# Patient Record
Sex: FEMALE | Race: WHITE | NOT HISPANIC OR LATINO | Employment: PART TIME | URBAN - METROPOLITAN AREA
[De-identification: names, ages, dates, MRNs, and addresses within clinical notes are randomized per-mention and may not be internally consistent; named-entity substitution may affect disease eponyms.]

---

## 2024-04-07 ENCOUNTER — OFFICE VISIT (OUTPATIENT)
Dept: URGENT CARE | Facility: URGENT CARE | Age: 24
End: 2024-04-07
Payer: COMMERCIAL

## 2024-04-07 VITALS
DIASTOLIC BLOOD PRESSURE: 86 MMHG | OXYGEN SATURATION: 99 % | HEART RATE: 98 BPM | SYSTOLIC BLOOD PRESSURE: 137 MMHG | RESPIRATION RATE: 20 BRPM | TEMPERATURE: 97.9 F | WEIGHT: 156.8 LBS

## 2024-04-07 DIAGNOSIS — H92.01 RIGHT EAR PAIN: Primary | ICD-10-CM

## 2024-04-07 DIAGNOSIS — J06.9 VIRAL URI WITH COUGH: ICD-10-CM

## 2024-04-07 DIAGNOSIS — H61.23 EXCESSIVE EAR WAX, BILATERAL: ICD-10-CM

## 2024-04-07 PROCEDURE — 99203 OFFICE O/P NEW LOW 30 MIN: CPT | Mod: 25 | Performed by: INTERNAL MEDICINE

## 2024-04-07 PROCEDURE — 69209 REMOVE IMPACTED EAR WAX UNI: CPT | Mod: RT | Performed by: INTERNAL MEDICINE

## 2024-04-07 RX ORDER — AMOXICILLIN 500 MG/1
500 CAPSULE ORAL 3 TIMES DAILY
Qty: 30 CAPSULE | Refills: 0 | Status: SHIPPED | OUTPATIENT
Start: 2024-04-07 | End: 2024-04-17

## 2024-04-07 NOTE — PATIENT INSTRUCTIONS
Ear wax removed  TM looks decent but with recent sinus cold you are set up for ear infection  Can observe, but if pain not better, then start amoxicillin

## 2024-04-07 NOTE — PROGRESS NOTES
ASSESSMENT AND PLAN:      ICD-10-CM    1. Right ear pain  H92.01 amoxicillin (AMOXIL) 500 MG capsule      2. Excessive ear wax, bilateral  H61.23 IA REMOVAL IMPACTED CERUMEN IRRIGATION/LVG UNILAT     CANCELED: IA REMOVAL IMPACTED CERUMEN IRRIGATION/LVG UNILAT      3. Viral URI with cough  J06.9         Patient Instructions     Ear wax removed  TM looks decent but with recent sinus cold you are set up for ear infection  Can observe, but if pain not better, then start amoxicillin      Return if symptoms worsen or fail to improve, for ear check.        Diana Sarkar MD  [facility] URGENT [facility]ARE    Subjective     Ruby Negrete is a 23 year old who presents for Patient presents with:  Otalgia: right    a new patient of [facility].      Onset of sinus cold symptoms was 1 week(s) ago.  Course of illness is improved but now ear pain.      Current and Associated symptoms: cough - productive and ear pain right  Denies fever, facial pain/pressure, and fatigue  Treatment measures tried include ear drops  Predisposing factors include ill contact: Work      Review of Systems        Objective    /86   Pulse 98   Temp 97.9  F (36.6  C) (Tympanic)   Resp 20   Wt 71.1 kg (156 lb 12.8 oz)   SpO2 99%   Physical Exam  Vitals reviewed.   Constitutional:       Appearance: Normal appearance.   HENT:      Right Ear: There is impacted cerumen.      Left Ear: There is impacted cerumen.      Nose: Congestion present.      Mouth/Throat:      Mouth: Mucous membranes are moist.      Pharynx: Oropharynx is clear.   Cardiovascular:      Rate and Rhythm: Normal rate and regular rhythm.      Pulses: Normal pulses.      Heart sounds: Normal heart sounds.   Pulmonary:      Effort: Pulmonary effort is normal.      Breath sounds: Normal breath sounds.   Neurological:      Mental Status: She is alert.        M.A. irrigated right ear -   Wax removed  TM boggy.

## 2024-08-08 NOTE — PATIENT INSTRUCTIONS
If you have labs or imaging done, the results will automatically release in [email or web address] without an interpretation.  Your health care professional will review those results and send an interpretation with recommendations as soon as possible, but this may be 1-3 business days.    If you have any questions regarding your visit, please contact your care team.     [facility]: [phone]  [facility] CLINIC HOURS TELEPHONE NUMBER   Yogesh PRABHAKAR Andreas .      Violeta-KALEB Lopez-KALEB Segura-Surgery Scheduler  Matilda-         Monday-[place]  8:00 am-4:00 pm  [facility]4:00 [facility]  Wednesday-[place] 8:00 am-4:00 pm  Thursday-[place] 8:00 am-4:00 pm    Typical Surgery Day Friday [facility]  [address] [place] [address]  PH: [phone] Fax    Imaging Scheduling all locations  PH: [phone]     [facility]  [address]  [place], [place] [address] [phone]    [facility]  [address] [place] [address]  PH: [phone]     **Surgeries** Our Surgery Schedulers will contact you to schedule. If you do not receive a call within 3 business days, please call [phone].  Urgent Care locations:  [facility]       Monday-Friday   10 am - 8 pm  Saturday and Sunday   9 am - 5 pm   [phone]   If you need a medication refill, please contact your pharmacy. Please allow 3 business days for your refill to be completed.  As always, Thank you for trusting us with your healthcare needs!

## 2024-08-12 ENCOUNTER — OFFICE VISIT (OUTPATIENT)
Dept: OBGYN | Facility: CLINIC | Age: 24
End: 2024-08-12
Payer: COMMERCIAL

## 2024-08-12 VITALS
WEIGHT: 154.6 LBS | SYSTOLIC BLOOD PRESSURE: 125 MMHG | DIASTOLIC BLOOD PRESSURE: 81 MMHG | HEIGHT: 66 IN | HEART RATE: 64 BPM | BODY MASS INDEX: 24.85 KG/M2

## 2024-08-12 DIAGNOSIS — Z01.419 ENCOUNTER FOR ANNUAL ROUTINE GYNECOLOGICAL EXAMINATION: Primary | ICD-10-CM

## 2024-08-12 DIAGNOSIS — Z12.4 CERVICAL CANCER SCREENING: ICD-10-CM

## 2024-08-12 DIAGNOSIS — Z11.3 SCREENING FOR STDS (SEXUALLY TRANSMITTED DISEASES): ICD-10-CM

## 2024-08-12 LAB
C TRACH DNA SPEC QL PROBE+SIG AMP: NEGATIVE
N GONORRHOEA DNA SPEC QL NAA+PROBE: NEGATIVE

## 2024-08-12 PROCEDURE — 87491 CHLMYD TRACH DNA AMP PROBE: CPT | Performed by: GENERAL PRACTICE

## 2024-08-12 PROCEDURE — 99385 PREV VISIT NEW AGE 18-39: CPT | Performed by: GENERAL PRACTICE

## 2024-08-12 PROCEDURE — 99213 OFFICE O/P EST LOW 20 MIN: CPT | Mod: 25 | Performed by: GENERAL PRACTICE

## 2024-08-12 PROCEDURE — G0145 SCR C/V CYTO,THINLAYER,RESCR: HCPCS | Performed by: GENERAL PRACTICE

## 2024-08-12 PROCEDURE — 87591 N.GONORRHOEAE DNA AMP PROB: CPT | Performed by: GENERAL PRACTICE

## 2024-08-12 PROCEDURE — 99459 PELVIC EXAMINATION: CPT | Performed by: GENERAL PRACTICE

## 2024-08-12 RX ORDER — DROSPIRENONE AND ETHINYL ESTRADIOL 0.02-3(28)
1 KIT ORAL DAILY
Status: CANCELLED | OUTPATIENT
Start: 2024-08-12

## 2024-08-12 RX ORDER — SERTRALINE HYDROCHLORIDE 100 MG/1
100 TABLET, FILM COATED ORAL
COMMUNITY
Start: 2023-01-03

## 2024-08-12 ASSESSMENT — PATIENT HEALTH QUESTIONNAIRE - PHQ9
SUM OF ALL RESPONSES TO PHQ QUESTIONS 1-9: 8
5. POOR APPETITE OR OVEREATING: MORE THAN HALF THE DAYS

## 2024-08-12 ASSESSMENT — ANXIETY QUESTIONNAIRES
6. BECOMING EASILY ANNOYED OR IRRITABLE: SEVERAL DAYS
3. WORRYING TOO MUCH ABOUT DIFFERENT THINGS: MORE THAN HALF THE DAYS
5. BEING SO RESTLESS THAT IT IS HARD TO SIT STILL: SEVERAL DAYS
1. FEELING NERVOUS, ANXIOUS, OR ON EDGE: MORE THAN HALF THE DAYS
7. FEELING AFRAID AS IF SOMETHING AWFUL MIGHT HAPPEN: MORE THAN HALF THE DAYS
GAD7 TOTAL SCORE: 13
2. NOT BEING ABLE TO STOP OR CONTROL WORRYING: NEARLY EVERY DAY
GAD7 TOTAL SCORE: 13

## 2024-08-12 NOTE — PROGRESS NOTES
Ruby is a 23 year old  here for annual exam. Patient reports significant PMDD type symptoms in the week leading up to a menstrual cycle.  She is currently on Zoloft for generalized anxiety.  She does report to intermittently taking her Zoloft.  No other specific issues or concerns today.    Currently sexually active with male partner.     She is currently using nothing for birth control.      ROS:   Weight loss or gain: denies  Abdominal bloating / pain: denies  Appetite: normal  Energy: normal  Nausea / vomiting: denies  Fevers / chills / night sweats: denies  SOB / cough: denies  Chest pain / palpitations: denies  Diarrhea / constipation: denies  Bloody / tar-colored stools: denies  Urinary frequency / urgency / blood: denies  Headaches / vision changes: denies  Mouth sores: denies  Skin changes / rashes: denies      GYNHx:   Patient's last menstrual period was 2024.  Cycles: Monthly, very regular. Lasting 3-5 days with 2 heavy days  Menorrhagia: +  Dysmenorrhea: denies  Last paps: denies  History STI: denies  Gardasil Hx: unsure    OBHx:  OB History    Para Term  AB Living   0 0 0 0 0 0   SAB IAB Ectopic Multiple Live Births   0 0 0 0 0       PSH:   History reviewed. No pertinent surgical history.      PMH:  History reviewed. No pertinent past medical history.   Generalized anxiety      MEDs  Current Outpatient Medications   Medication Sig Dispense Refill    sertraline (ZOLOFT) 100 MG tablet Take 100 mg by mouth       No current facility-administered medications for this visit.       Allergies:  No Known Allergies    FamHx:  History reviewed. No pertinent family history.    SocHx:  Social History     Socioeconomic History    Marital status: Single     Spouse name: Not on file    Number of children: Not on file    Years of education: Not on file    Highest education level: Not on file   Occupational History    Not on file   Tobacco Use    Smoking status: Never    Smokeless tobacco:  Never   Vaping Use    Vaping status: Never Used   Substance and Sexual Activity    Alcohol use: Yes     Comment: a little bit    Drug use: Not Currently    Sexual activity: Yes     Birth control/protection: None   Other Topics Concern    Not on file   Social History Narrative    Not on file     Social Determinants of Health     Financial Resource Strain: Low Risk  (1/3/2023)    Received from [facility], [facility]    Overall Financial Resource Strain (CARDIA)     Difficulty of Paying Living Expenses: Not hard at all   Food Insecurity: No Food Insecurity (1/3/2023)    Received from [facility], [facility]    Hunger Vital Sign     Worried About Running Out of Food in the Last Year: Never true     Ran Out of Food in the Last Year: Never true   Transportation Needs: No Transportation Needs (1/3/2023)    Received from [facility], [facility]    PRAPARE - Transportation     Lack of Transportation (Medical): No     Lack of Transportation (Non-Medical): No   Physical Activity: Sufficiently Active (1/3/2023)    Received from [facility], [facility]    Exercise Vital Sign     Days of Exercise per Week: 3 days     Minutes of Exercise per Session: 50 min   Stress: Stress Concern Present (1/3/2023)    Received from [facility], [facility]    [employer] [place] [employer] - Occupational Stress Questionnaire     Feeling of Stress : To some extent   Social Connections: Moderately Isolated (1/3/2023)    Received from [facility], [facility]    Social Connection and Isolation Panel [NHANES]     Frequency of Communication with Friends and Family: Twice a week     Frequency of Social Gatherings with Friends and Family: Once a week     Attends Evangelical Services: Never     " Active Member of Clubs or Organizations: No     Attends Club or Organization Meetings: Never     Marital Status: Living with partner   Interpersonal Safety: Not At Risk (1/3/2023)    Received from [facility], [facility]    Humiliation, Afraid, Rape, and Kick questionnaire     Fear of Current or Ex-Partner: No     Emotionally Abused: No     Physically Abused: No     Sexually Abused: No   Housing Stability: Low Risk  (1/3/2023)    Received from [facility], [facility]    Housing Stability     In the last 12 months, was there a time when you were not able to pay the mortgage or rent on time?: No     In the last 12 months, how many places have you lived?: 2     Number of Places Lived in the Last Year (Outpatient): Not on file     Number of Places Lived in the Last Year (Inpatient): Not on file     In the last 12 months, was there a time when you did not have a steady place to sleep or slept in a shelter (including now)?: No           PE: /81   Pulse 64   Ht 1.683 m (5' 6.25\")   Wt 70.1 kg (154 lb 9.6 oz)   LMP 08/02/2024   BMI 24.77 kg/m    Body mass index is 24.77 kg/m .    General Appearance:  healthy, alert, active, no distress  Cardiovascular:  Regular rate and Rhythm  Neck: Supple, no adenopathy, and thyroid normal  Lungs:  Clear, without wheeze, rale or rhonchi  Breast: Bilateral breast exam normal with no palpable masses, dimpling, retractions, erythema, streaking or nipple discharge.  Abdomen: Benign, No masses, organomegaly, No inguinal nodes, and Soft, non-tender.   Pelvic:       - Ext: Vulva and perineum are normal without lesion, mass or discharge        - Urethra: normal without discharge or scarring        - Bladder: No tenderness       - Vagina: Normal vaginal mucosa with no masses or lesions, physiologic discharge noted, no blood in the vault.       - Cervix: Normal appearing cervix with no masses or " lesions.  Pap smear and GC/CT collected.       - Uterus:Normal shape, position and consistency          A/P: [facility], no abnormal findings.  Patient with PMDD  -PMDD: Recommend first-line treatment is consistent use of her Zoloft.  Discussed with patient she should be taking her Zoloft every day as prescribed as intermittent use will not achieve desired results.  Also discussed starting OCPs as a means of helping improve her PMDD symptoms.  Patient does not desire to start birth control at this time.  -Patient sexually active and not on birth control.  Recommended contraception in the form of hormones, copper IUD or condoms.  If not using the effective form of birth control would recommend she starts prenatal vitamins.   - Pap was performed       - Encouraged healthy diet, regular exercise, self-breast examination.      Yogesh Johns DO, FACOG

## 2024-08-15 LAB
BKR LAB AP GYN ADEQUACY: NORMAL
BKR LAB AP GYN INTERPRETATION: NORMAL
BKR LAB AP HPV REFLEX: NO
BKR LAB AP PREVIOUS ABNORMAL: NORMAL
PATH REPORT.COMMENTS IMP SPEC: NORMAL
PATH REPORT.COMMENTS IMP SPEC: NORMAL
PATH REPORT.RELEVANT HX SPEC: NORMAL